# Patient Record
Sex: MALE | Race: BLACK OR AFRICAN AMERICAN
[De-identification: names, ages, dates, MRNs, and addresses within clinical notes are randomized per-mention and may not be internally consistent; named-entity substitution may affect disease eponyms.]

---

## 2020-07-21 ENCOUNTER — HOSPITAL ENCOUNTER (OUTPATIENT)
Dept: HOSPITAL 74 - FASU-ENDO | Age: 75
Discharge: HOME | End: 2020-07-21
Attending: INTERNAL MEDICINE
Payer: COMMERCIAL

## 2020-07-21 VITALS — DIASTOLIC BLOOD PRESSURE: 64 MMHG | SYSTOLIC BLOOD PRESSURE: 117 MMHG | HEART RATE: 71 BPM

## 2020-07-21 VITALS — TEMPERATURE: 97.6 F

## 2020-07-21 VITALS — BODY MASS INDEX: 30.2 KG/M2

## 2020-07-21 DIAGNOSIS — K57.30: ICD-10-CM

## 2020-07-21 DIAGNOSIS — D12.2: ICD-10-CM

## 2020-07-21 DIAGNOSIS — D12.0: ICD-10-CM

## 2020-07-21 DIAGNOSIS — Z87.19: Primary | ICD-10-CM

## 2020-07-21 DIAGNOSIS — K64.8: ICD-10-CM

## 2020-07-21 PROCEDURE — 0DBH8ZX EXCISION OF CECUM, VIA NATURAL OR ARTIFICIAL OPENING ENDOSCOPIC, DIAGNOSTIC: ICD-10-PCS | Performed by: INTERNAL MEDICINE

## 2020-07-21 PROCEDURE — 0DBL8ZX EXCISION OF TRANSVERSE COLON, VIA NATURAL OR ARTIFICIAL OPENING ENDOSCOPIC, DIAGNOSTIC: ICD-10-PCS | Performed by: INTERNAL MEDICINE

## 2020-07-24 NOTE — PATH
Surgical Pathology Report



Patient Name:  JERZY MOMIN

Accession #:  

[facility]. Rec. #:  [id number]                                                        

   /Age/Gender:  1945 (Age: 74) / M

Account:  [id number]                                                          

             Location: [facility]

Taken:  2020

Received:  2020

Reported:  2020

Physicians:  Joaquín Dunne M.D.

  



Specimen(s) Received

A: POLYP CECUM 

B: POLYP ASCENDING COLON 





Clinical History

GI bleed

Postoperative diagnosis: Colon polyps, diverticulosis







Final Diagnosis

A. CECUM, POLYP, BIOPSY:

TUBULAR ADENOMA.



B. ASCENDING COLON, POLYP, BIOPSY:

COLONIC MUCOSA SHOWING BENIGN/REACTIVE LYMPHOID AGGREGATE.





***Electronically Signed***

Taina Miller M.D.





Gross Description

A.  Received in formalin, labeled "biopsy polyp cecum" are 4 tan, irregular

portions of soft tissue ranging from 0.2-0.3 cm. in greatest dimension. The

specimens are submitted in toto in one cassette.



B.  Received in formalin, labeled "biopsy polyp ascending colon" are 2 tan,

irregular portions of soft tissue averaging 0.1 cm. in greatest dimension. The

specimens are submitted in toto in one cassette.

/2020



saudi/2020